# Patient Record
Sex: MALE | Race: WHITE | NOT HISPANIC OR LATINO
[De-identification: names, ages, dates, MRNs, and addresses within clinical notes are randomized per-mention and may not be internally consistent; named-entity substitution may affect disease eponyms.]

---

## 2021-11-24 ENCOUNTER — TRANSCRIPTION ENCOUNTER (OUTPATIENT)
Age: 56
End: 2021-11-24

## 2024-05-10 PROBLEM — Z00.00 ENCOUNTER FOR PREVENTIVE HEALTH EXAMINATION: Status: ACTIVE | Noted: 2024-05-10

## 2024-05-14 ENCOUNTER — APPOINTMENT (OUTPATIENT)
Dept: ORTHOPEDIC SURGERY | Facility: CLINIC | Age: 59
End: 2024-05-14
Payer: COMMERCIAL

## 2024-05-14 DIAGNOSIS — M77.8 OTHER ENTHESOPATHIES, NOT ELSEWHERE CLASSIFIED: ICD-10-CM

## 2024-05-14 DIAGNOSIS — M54.10 RADICULOPATHY, SITE UNSPECIFIED: ICD-10-CM

## 2024-05-14 DIAGNOSIS — M25.512 PAIN IN LEFT SHOULDER: ICD-10-CM

## 2024-05-14 PROCEDURE — 99213 OFFICE O/P EST LOW 20 MIN: CPT

## 2024-05-14 PROCEDURE — 73030 X-RAY EXAM OF SHOULDER: CPT | Mod: LT

## 2024-05-14 PROCEDURE — 72040 X-RAY EXAM NECK SPINE 2-3 VW: CPT

## 2024-05-14 RX ORDER — CYCLOBENZAPRINE HYDROCHLORIDE 5 MG/1
5 TABLET, FILM COATED ORAL
Qty: 14 | Refills: 0 | Status: ACTIVE | COMMUNITY
Start: 2024-05-14 | End: 1900-01-01

## 2024-05-14 NOTE — HISTORY OF PRESENT ILLNESS
[Neck] : neck [Left Arm] : left arm [Gradual] : gradual [6] : 6 [5] : 5 [Stabbing] : stabbing [Intermittent] : intermittent [Rest] : rest [Exercising] : exercising [Full time] : Work status: full time [de-identified] : 5/14/24:  recurrent left shoulder and neck pain since early 2024 due to golfing.   [] : no [FreeTextEntry5] : Pt has been treated in the past by dr ragsdale for his c spine and left shoulder, pt has pain with reaching as well as any movement of the shoulder as well as golfing. pt also has numbness and tingling down into the left pinky ring and middle fingers  [de-identified] : pt with relief  [de-identified] :

## 2024-05-14 NOTE — ASSESSMENT
[FreeTextEntry1] : recurrent left shoulder and neck pain since early 2024 due to golfing.   left shoulder.  history of tendonitis.  some superior subluxation but no recently trauma.  mostly seems like neck today.  neck with left trap pain.  occ left arm radicular symptoms.  possible hnp.   htn, high cholesterol, sarcoidosis history of pacemaker - xarelto - cannot do mri

## 2024-05-14 NOTE — PHYSICAL EXAM
[Left] : left shoulder [4 ___] : forward flexion 4[unfilled]/5 [4___] : abduction 4[unfilled]/5 [] : no erythema [TWNoteComboBox7] : active forward flexion 145 degrees [de-identified] : active abduction 120 degrees [TWNoteComboBox6] : internal rotation L5 [de-identified] : external rotation 60 degrees